# Patient Record
Sex: FEMALE | URBAN - METROPOLITAN AREA
[De-identification: names, ages, dates, MRNs, and addresses within clinical notes are randomized per-mention and may not be internally consistent; named-entity substitution may affect disease eponyms.]

---

## 2022-11-21 PROCEDURE — 80053 COMPREHEN METABOLIC PANEL: CPT | Performed by: NURSE PRACTITIONER

## 2022-11-21 PROCEDURE — 85025 COMPLETE CBC W/AUTO DIFF WBC: CPT | Performed by: NURSE PRACTITIONER

## 2022-11-21 PROCEDURE — 86225 DNA ANTIBODY NATIVE: CPT | Performed by: NURSE PRACTITIONER

## 2022-11-21 PROCEDURE — 86038 ANTINUCLEAR ANTIBODIES: CPT | Performed by: NURSE PRACTITIONER

## 2022-11-21 PROCEDURE — 86235 NUCLEAR ANTIGEN ANTIBODY: CPT | Performed by: NURSE PRACTITIONER

## 2022-11-21 PROCEDURE — 86039 ANTINUCLEAR ANTIBODIES (ANA): CPT | Performed by: NURSE PRACTITIONER

## 2022-11-22 ENCOUNTER — LAB REQUISITION (OUTPATIENT)
Dept: LAB | Facility: HOSPITAL | Age: 37
End: 2022-11-22
Payer: COMMERCIAL

## 2022-11-22 DIAGNOSIS — K21.9 GASTRO-ESOPHAGEAL REFLUX DISEASE WITHOUT ESOPHAGITIS: ICD-10-CM

## 2022-11-22 DIAGNOSIS — M25.50 PAIN IN UNSPECIFIED JOINT: ICD-10-CM

## 2022-11-22 DIAGNOSIS — L29.9 PRURITUS, UNSPECIFIED: ICD-10-CM

## 2022-11-22 LAB
ALBUMIN SERPL-MCNC: 4 G/DL (ref 3.4–5)
ALBUMIN/GLOB SERPL: 1.1 {RATIO} (ref 1–2)
ALP LIVER SERPL-CCNC: 107 U/L
ALT SERPL-CCNC: 63 U/L
ANION GAP SERPL CALC-SCNC: 7 MMOL/L (ref 0–18)
AST SERPL-CCNC: 44 U/L (ref 15–37)
BASOPHILS # BLD AUTO: 0.03 X10(3) UL (ref 0–0.2)
BASOPHILS NFR BLD AUTO: 0.4 %
BILIRUB SERPL-MCNC: 0.3 MG/DL (ref 0.1–2)
BUN BLD-MCNC: 10 MG/DL (ref 7–18)
BUN/CREAT SERPL: 18.9 (ref 10–20)
CALCIUM BLD-MCNC: 9 MG/DL (ref 8.5–10.1)
CHLORIDE SERPL-SCNC: 105 MMOL/L (ref 98–112)
CO2 SERPL-SCNC: 28 MMOL/L (ref 21–32)
CREAT BLD-MCNC: 0.53 MG/DL
DEPRECATED RDW RBC AUTO: 47 FL (ref 35.1–46.3)
EOSINOPHIL # BLD AUTO: 0.22 X10(3) UL (ref 0–0.7)
EOSINOPHIL NFR BLD AUTO: 3.2 %
ERYTHROCYTE [DISTWIDTH] IN BLOOD BY AUTOMATED COUNT: 14 % (ref 11–15)
GFR SERPLBLD BASED ON 1.73 SQ M-ARVRAT: 123 ML/MIN/1.73M2 (ref 60–?)
GLOBULIN PLAS-MCNC: 3.8 G/DL (ref 2.8–4.4)
GLUCOSE BLD-MCNC: 84 MG/DL (ref 70–99)
HCT VFR BLD AUTO: 42.6 %
HGB BLD-MCNC: 13.1 G/DL
IMM GRANULOCYTES # BLD AUTO: 0.01 X10(3) UL (ref 0–1)
IMM GRANULOCYTES NFR BLD: 0.1 %
LYMPHOCYTES # BLD AUTO: 2.64 X10(3) UL (ref 1–4)
LYMPHOCYTES NFR BLD AUTO: 38.3 %
MCH RBC QN AUTO: 27.8 PG (ref 26–34)
MCHC RBC AUTO-ENTMCNC: 30.8 G/DL (ref 31–37)
MCV RBC AUTO: 90.3 FL
MONOCYTES # BLD AUTO: 0.39 X10(3) UL (ref 0.1–1)
MONOCYTES NFR BLD AUTO: 5.7 %
NEUTROPHILS # BLD AUTO: 3.61 X10 (3) UL (ref 1.5–7.7)
NEUTROPHILS # BLD AUTO: 3.61 X10(3) UL (ref 1.5–7.7)
NEUTROPHILS NFR BLD AUTO: 52.3 %
OSMOLALITY SERPL CALC.SUM OF ELEC: 288 MOSM/KG (ref 275–295)
PLATELET # BLD AUTO: 211 10(3)UL (ref 150–450)
POTASSIUM SERPL-SCNC: 4 MMOL/L (ref 3.5–5.1)
PROT SERPL-MCNC: 7.8 G/DL (ref 6.4–8.2)
RBC # BLD AUTO: 4.72 X10(6)UL
SODIUM SERPL-SCNC: 140 MMOL/L (ref 136–145)
WBC # BLD AUTO: 6.9 X10(3) UL (ref 4–11)

## 2022-11-25 LAB
DSDNA AB TITR SER: <10 {TITER}
NUCLEAR IGG TITR SER IF: POSITIVE {TITER}

## 2022-11-26 LAB — ANA NUCLEOLAR TITR SER IF: 5120 {TITER}

## 2022-11-28 LAB
CENTROMERE IGG SER-ACNC: >240 U/ML
ENA JO1 AB SER IA-ACNC: <0.3 U/ML
ENA RNP IGG SER IA-ACNC: 2 U/ML
ENA SCL70 IGG SER IA-ACNC: <0.6 U/ML
ENA SM IGG SER IA-ACNC: <0.7 U/ML
ENA SS-A IGG SER IA-ACNC: <0.4 U/ML
ENA SS-B IGG SER IA-ACNC: <0.4 U/ML
U1 SNRNP IGG SER IA-ACNC: 1.3 U/ML

## 2023-01-17 ENCOUNTER — LAB ENCOUNTER (OUTPATIENT)
Dept: LAB | Facility: HOSPITAL | Age: 38
End: 2023-01-17
Attending: INTERNAL MEDICINE
Payer: COMMERCIAL

## 2023-01-17 ENCOUNTER — OFFICE VISIT (OUTPATIENT)
Dept: RHEUMATOLOGY | Facility: CLINIC | Age: 38
End: 2023-01-17

## 2023-01-17 VITALS
DIASTOLIC BLOOD PRESSURE: 70 MMHG | WEIGHT: 205 LBS | HEIGHT: 62 IN | SYSTOLIC BLOOD PRESSURE: 121 MMHG | HEART RATE: 95 BPM | BODY MASS INDEX: 37.73 KG/M2

## 2023-01-17 DIAGNOSIS — M34.1 CREST SYNDROME (HCC): Primary | ICD-10-CM

## 2023-01-17 DIAGNOSIS — M25.50 POLYARTHRALGIA: ICD-10-CM

## 2023-01-17 DIAGNOSIS — E03.9 HYPOTHYROIDISM, UNSPECIFIED TYPE: ICD-10-CM

## 2023-01-17 DIAGNOSIS — M79.10 MYALGIA: ICD-10-CM

## 2023-01-17 DIAGNOSIS — R76.8 POSITIVE ANA (ANTINUCLEAR ANTIBODY): ICD-10-CM

## 2023-01-17 DIAGNOSIS — M34.1 CREST SYNDROME (HCC): ICD-10-CM

## 2023-01-17 LAB
ALBUMIN SERPL-MCNC: 4 G/DL (ref 3.4–5)
ALP LIVER SERPL-CCNC: 118 U/L
ALT SERPL-CCNC: 50 U/L
AST SERPL-CCNC: 38 U/L (ref 15–37)
BILIRUB DIRECT SERPL-MCNC: 0.1 MG/DL (ref 0–0.2)
BILIRUB SERPL-MCNC: 0.3 MG/DL (ref 0.1–2)
CK SERPL-CCNC: 74 U/L
PROT SERPL-MCNC: 8.2 G/DL (ref 6.4–8.2)
RHEUMATOID FACT SERPL-ACNC: 10 IU/ML (ref ?–15)
THYROGLOB SERPL-MCNC: <15 U/ML (ref ?–60)
THYROPEROXIDASE AB SERPL-ACNC: 59 U/ML (ref ?–60)
TSI SER-ACNC: 2.23 MIU/ML (ref 0.36–3.74)

## 2023-01-17 PROCEDURE — 83516 IMMUNOASSAY NONANTIBODY: CPT

## 2023-01-17 PROCEDURE — 99244 OFF/OP CNSLTJ NEW/EST MOD 40: CPT | Performed by: INTERNAL MEDICINE

## 2023-01-17 PROCEDURE — 84443 ASSAY THYROID STIM HORMONE: CPT

## 2023-01-17 PROCEDURE — 86256 FLUORESCENT ANTIBODY TITER: CPT

## 2023-01-17 PROCEDURE — 36415 COLL VENOUS BLD VENIPUNCTURE: CPT

## 2023-01-17 PROCEDURE — 80076 HEPATIC FUNCTION PANEL: CPT

## 2023-01-17 PROCEDURE — 86200 CCP ANTIBODY: CPT

## 2023-01-17 PROCEDURE — 82085 ASSAY OF ALDOLASE: CPT

## 2023-01-17 PROCEDURE — 86431 RHEUMATOID FACTOR QUANT: CPT

## 2023-01-17 PROCEDURE — 86376 MICROSOMAL ANTIBODY EACH: CPT

## 2023-01-17 PROCEDURE — 3078F DIAST BP <80 MM HG: CPT | Performed by: INTERNAL MEDICINE

## 2023-01-17 PROCEDURE — 86800 THYROGLOBULIN ANTIBODY: CPT

## 2023-01-17 PROCEDURE — 82550 ASSAY OF CK (CPK): CPT

## 2023-01-17 PROCEDURE — 3074F SYST BP LT 130 MM HG: CPT | Performed by: INTERNAL MEDICINE

## 2023-01-17 PROCEDURE — 3008F BODY MASS INDEX DOCD: CPT | Performed by: INTERNAL MEDICINE

## 2023-01-17 NOTE — PATIENT INSTRUCTIONS
Summary:  1. Check labs   2. Info on raynaud's and CREST   3. Hand warmers - consider electric -   4. Keep core temperature warm , use layers   5. Return to clinic in 2-3 weeks.    6. Voltaren gel 1 % topical - use this over her right knee

## 2023-01-19 LAB
ALDOLASE, SERUM: 5.7 U/L
CCP IGG SERPL-ACNC: 1.6 U/ML (ref 0–6.9)
F-ACTIN (SMOOTH MUSCLE) AB: 45 UNITS
MITOCHONDRIAL M2 AB, IGG: 3.7 UNITS

## 2023-02-07 ENCOUNTER — TELEMEDICINE (OUTPATIENT)
Dept: RHEUMATOLOGY | Facility: CLINIC | Age: 38
End: 2023-02-07

## 2023-02-07 DIAGNOSIS — R76.8 POSITIVE ANA (ANTINUCLEAR ANTIBODY): Primary | ICD-10-CM

## 2023-02-07 DIAGNOSIS — M34.1 CREST SYNDROME (HCC): ICD-10-CM

## 2023-02-07 DIAGNOSIS — R79.89 ELEVATED LIVER FUNCTION TESTS: ICD-10-CM

## 2023-02-07 PROCEDURE — 99214 OFFICE O/P EST MOD 30 MIN: CPT | Performed by: INTERNAL MEDICINE

## 2023-02-07 NOTE — PATIENT INSTRUCTIONS
1. Repeat labs in 6 months for liver tests   2. Return to clinic in 6-12 months. 3. Hand warmers - consider electric -   4. Keep core temperature warm , use layers   5. Voltaren gel 1 % topical - use this over her right knee

## 2024-05-23 ENCOUNTER — LAB REQUISITION (OUTPATIENT)
Dept: LAB | Facility: HOSPITAL | Age: 39
End: 2024-05-23

## 2024-05-23 DIAGNOSIS — N95.1 MENOPAUSAL AND FEMALE CLIMACTERIC STATES: ICD-10-CM

## 2024-05-23 DIAGNOSIS — Z01.419 ENCOUNTER FOR GYNECOLOGICAL EXAMINATION (GENERAL) (ROUTINE) WITHOUT ABNORMAL FINDINGS: ICD-10-CM

## 2024-05-23 PROCEDURE — 87624 HPV HI-RISK TYP POOLED RSLT: CPT | Performed by: FAMILY MEDICINE

## 2024-05-24 LAB — HPV I/H RISK 1 DNA SPEC QL NAA+PROBE: POSITIVE

## 2024-05-29 LAB — LAST PAP RESULT: NORMAL

## 2024-06-28 ENCOUNTER — LAB ENCOUNTER (OUTPATIENT)
Dept: LAB | Facility: HOSPITAL | Age: 39
End: 2024-06-28
Attending: FAMILY MEDICINE
Payer: COMMERCIAL

## 2024-06-28 DIAGNOSIS — Z00.00 WELL ADULT EXAM: ICD-10-CM

## 2024-06-28 DIAGNOSIS — N95.1 MENOPAUSAL AND FEMALE CLIMACTERIC STATES: ICD-10-CM

## 2024-06-28 LAB
ALBUMIN SERPL-MCNC: 4.7 G/DL (ref 3.2–4.8)
ALBUMIN/GLOB SERPL: 1.4 {RATIO} (ref 1–2)
ALP LIVER SERPL-CCNC: 107 U/L
ALT SERPL-CCNC: 64 U/L
ANION GAP SERPL CALC-SCNC: 8 MMOL/L (ref 0–18)
AST SERPL-CCNC: 54 U/L (ref ?–34)
BASOPHILS # BLD AUTO: 0.03 X10(3) UL (ref 0–0.2)
BASOPHILS NFR BLD AUTO: 0.4 %
BILIRUB SERPL-MCNC: 0.4 MG/DL (ref 0.3–1.2)
BUN BLD-MCNC: 8 MG/DL (ref 9–23)
BUN/CREAT SERPL: 15.4 (ref 10–20)
CALCIUM BLD-MCNC: 9.6 MG/DL (ref 8.7–10.4)
CHLORIDE SERPL-SCNC: 106 MMOL/L (ref 98–112)
CHOLEST SERPL-MCNC: 204 MG/DL (ref ?–200)
CO2 SERPL-SCNC: 26 MMOL/L (ref 21–32)
CREAT BLD-MCNC: 0.52 MG/DL
DEPRECATED RDW RBC AUTO: 42.9 FL (ref 35.1–46.3)
EGFRCR SERPLBLD CKD-EPI 2021: 122 ML/MIN/1.73M2 (ref 60–?)
EOSINOPHIL # BLD AUTO: 0.28 X10(3) UL (ref 0–0.7)
EOSINOPHIL NFR BLD AUTO: 3.6 %
ERYTHROCYTE [DISTWIDTH] IN BLOOD BY AUTOMATED COUNT: 14 % (ref 11–15)
EST. AVERAGE GLUCOSE BLD GHB EST-MCNC: 123 MG/DL (ref 68–126)
FASTING PATIENT LIPID ANSWER: YES
FASTING STATUS PATIENT QL REPORTED: YES
FSH SERPL-ACNC: 49.5 MIU/ML
GLOBULIN PLAS-MCNC: 3.4 G/DL (ref 2–3.5)
GLUCOSE BLD-MCNC: 101 MG/DL (ref 70–99)
HBA1C MFR BLD: 5.9 % (ref ?–5.7)
HCT VFR BLD AUTO: 40.1 %
HDLC SERPL-MCNC: 36 MG/DL (ref 40–59)
HGB BLD-MCNC: 13.2 G/DL
IMM GRANULOCYTES # BLD AUTO: 0.01 X10(3) UL (ref 0–1)
IMM GRANULOCYTES NFR BLD: 0.1 %
LDLC SERPL CALC-MCNC: 145 MG/DL (ref ?–100)
LYMPHOCYTES # BLD AUTO: 2.7 X10(3) UL (ref 1–4)
LYMPHOCYTES NFR BLD AUTO: 35.1 %
MCH RBC QN AUTO: 27.8 PG (ref 26–34)
MCHC RBC AUTO-ENTMCNC: 32.9 G/DL (ref 31–37)
MCV RBC AUTO: 84.6 FL
MONOCYTES # BLD AUTO: 0.38 X10(3) UL (ref 0.1–1)
MONOCYTES NFR BLD AUTO: 4.9 %
NEUTROPHILS # BLD AUTO: 4.3 X10 (3) UL (ref 1.5–7.7)
NEUTROPHILS # BLD AUTO: 4.3 X10(3) UL (ref 1.5–7.7)
NEUTROPHILS NFR BLD AUTO: 55.9 %
NONHDLC SERPL-MCNC: 168 MG/DL (ref ?–130)
OSMOLALITY SERPL CALC.SUM OF ELEC: 288 MOSM/KG (ref 275–295)
PLATELET # BLD AUTO: 219 10(3)UL (ref 150–450)
POTASSIUM SERPL-SCNC: 4 MMOL/L (ref 3.5–5.1)
PROT SERPL-MCNC: 8.1 G/DL (ref 5.7–8.2)
RBC # BLD AUTO: 4.74 X10(6)UL
SODIUM SERPL-SCNC: 140 MMOL/L (ref 136–145)
TRIGL SERPL-MCNC: 126 MG/DL (ref 30–149)
TSI SER-ACNC: 2.75 MIU/ML (ref 0.55–4.78)
VLDLC SERPL CALC-MCNC: 24 MG/DL (ref 0–30)
WBC # BLD AUTO: 7.7 X10(3) UL (ref 4–11)

## 2024-06-28 PROCEDURE — 83001 ASSAY OF GONADOTROPIN (FSH): CPT

## 2024-06-28 PROCEDURE — 80061 LIPID PANEL: CPT

## 2024-06-28 PROCEDURE — 83036 HEMOGLOBIN GLYCOSYLATED A1C: CPT

## 2024-06-28 PROCEDURE — 36415 COLL VENOUS BLD VENIPUNCTURE: CPT

## 2024-06-28 PROCEDURE — 80053 COMPREHEN METABOLIC PANEL: CPT

## 2024-06-28 PROCEDURE — 84443 ASSAY THYROID STIM HORMONE: CPT

## 2024-06-28 PROCEDURE — 85025 COMPLETE CBC W/AUTO DIFF WBC: CPT

## 2024-07-12 ENCOUNTER — OFFICE VISIT (OUTPATIENT)
Dept: OBGYN CLINIC | Facility: CLINIC | Age: 39
End: 2024-07-12
Payer: COMMERCIAL

## 2024-07-12 VITALS
HEIGHT: 62 IN | SYSTOLIC BLOOD PRESSURE: 116 MMHG | HEART RATE: 98 BPM | WEIGHT: 221 LBS | BODY MASS INDEX: 40.67 KG/M2 | DIASTOLIC BLOOD PRESSURE: 79 MMHG

## 2024-07-12 DIAGNOSIS — E28.39 PREMATURE OVARIAN FAILURE: Primary | ICD-10-CM

## 2024-07-12 DIAGNOSIS — Z01.812 PRE-PROCEDURAL LABORATORY EXAMINATION: ICD-10-CM

## 2024-07-12 DIAGNOSIS — R87.810 CERVICAL HIGH RISK HUMAN PAPILLOMAVIRUS (HPV) DNA TEST POSITIVE: ICD-10-CM

## 2024-07-12 LAB
CONTROL LINE PRESENT WITH A CLEAR BACKGROUND (YES/NO): YES YES/NO
KIT LOT #: NORMAL NUMERIC
PREGNANCY TEST, URINE: NEGATIVE

## 2024-07-12 PROCEDURE — 3074F SYST BP LT 130 MM HG: CPT | Performed by: OBSTETRICS & GYNECOLOGY

## 2024-07-12 PROCEDURE — 99204 OFFICE O/P NEW MOD 45 MIN: CPT | Performed by: OBSTETRICS & GYNECOLOGY

## 2024-07-12 PROCEDURE — 3008F BODY MASS INDEX DOCD: CPT | Performed by: OBSTETRICS & GYNECOLOGY

## 2024-07-12 PROCEDURE — 3078F DIAST BP <80 MM HG: CPT | Performed by: OBSTETRICS & GYNECOLOGY

## 2024-07-12 PROCEDURE — 81025 URINE PREGNANCY TEST: CPT | Performed by: OBSTETRICS & GYNECOLOGY

## 2024-07-12 PROCEDURE — 57455 BIOPSY OF CERVIX W/SCOPE: CPT | Performed by: OBSTETRICS & GYNECOLOGY

## 2024-07-12 RX ORDER — ESTROGEN,CON/M-PROGEST ACET 0.625-5 MG
1 TABLET ORAL DAILY
Qty: 90 TABLET | Refills: 4 | Status: SHIPPED | OUTPATIENT
Start: 2024-07-12

## 2024-07-12 NOTE — PROCEDURES
Consent was obtained after the procedure was explained to the patient and questions answered.  A speculum was placed intravaginally.  Excess mucus was removed with a dry cotton applicator.  I then applied acetic acid to the cervix using a cotton-tipped applicator.  Under magnification I was able to fully visualize the squamocolumnar junction approximately 2 millimeters from the external os.  The green filter was also used at this stage of the exam to observe the vascular patterns.  Acetowhite was visualized at the junction at 12 o'clock and biopsy x 1  done there and sent to pathology.  Silver nitrate was used for hemostasis.   No mosaicism, punctation, or abnormal vessels were seen.  Colposcopy exam was satisfactory.  I discussed with the patient that if the biopsy shows DENA or less (normal) then I will recommend repeating Pap with HPV typing in 1 year. If the biopsy shows DENA-2 or worse then we will most likely recommend a LEEP procedure.  This was also discussed with the patient.  HPV prevention including future condom use in order to maximize resolution of the virus discussed with the patient.  No coitus for 2 weeks advised in order to prevent bleeding from today's biopsy.

## 2024-07-12 NOTE — PROGRESS NOTES
Chelsey Nelson is a 39 year old female  Patient's last menstrual period was 2023.   Chief Complaint   Patient presents with    Consult     Per patient consult on her abnormal pap smear     Pt has been sexually active in past, not now. Pt has hot flushes daily, night sweats.   OBSTETRICS HISTORY:     OB History    Para Term  AB Living   0 0 0 0 0 0   SAB IAB Ectopic Multiple Live Births   0 0 0 0 0       GYNE HISTORY:     Hx Prior Abnormal Pap: Yes   Use of Birth Control (if yes, specify type): None (2024  9:45 AM)  Hx Prior Abnormal Pap: Yes (2024  9:45 AM)        Latest Ref Rng & Units 2024     6:30 PM   RECENT PAP RESULTS   Thinprep Pap Negative for intraepithelial lesion or malignancy Atypical squamous cells of undetermined significance (ASC-US)    HPV Negative Positive          MEDICAL HISTORY:     Past Medical History:    Non Hodgkin's lymphoma (HCC)    Raynaud disease       SURGICAL HISTORY:     History reviewed. No pertinent surgical history.    SOCIAL HISTORY:     Social History     Socioeconomic History    Marital status: Single   Tobacco Use    Smoking status: Never     Passive exposure: Never    Smokeless tobacco: Never   Substance and Sexual Activity    Alcohol use: Not Currently        FAMILY HISTORY:     History reviewed. No pertinent family history.    MEDICATIONS:       Current Outpatient Medications:     estrogen conjugated-medroxyprogesterone (PREMPRO) 0.625-5 MG Oral Tab, Take 1 tablet by mouth daily., Disp: 90 tablet, Rfl: 4    ALLERGIES:       Allergies   Allergen Reactions    Shellfish HIVES, ITCHING, RASH, SHORTNESS OF BREATH, SWELLING, Tightness in Chest and Tightness in Throat         REVIEW OF SYSTEMS:     Constitutional:    denies fever / chills  Eyes:     denies blurred or double vision  Cardiovascular:  denies chest pain or palpitations  Respiratory:    denies shortness of breath  Gastrointestinal:  denies severe abdominal pain, frequent  diarrhea or constipation, nausea / vomiting  Genitourinary:    denies dysuria, bothersome incontinence  Skin/Breast:   denies any breast pain, lumps, or discharge  Neurological:    denies frequent severe headaches  Psychiatric:   denies depression or anxiety, thoughts of harming self or others  Heme/Lymph:    denies easy bruising or bleeding      PHYSICAL EXAM:   Blood pressure 116/79, pulse 98, height 5' 2\" (1.575 m), weight 221 lb (100.2 kg), last menstrual period 11/26/2023.  Constitutional:  well developed, well nourished  Head/Face:  normocephalic  Neck/Thyroid: thyroid symmetric, no thyromegaly, no nodules, no adenopathy  Lymphatic: no abnormal supraclavicular or axillary adenopathy is noted  Respiratory:      chest wall symmetric and nontender on palpation, clear to asculation bilateral, no wheezing, rales, ronchi, and resonance normal upon percussion  Cardiovascular: chest normal in appearance, regular rate and rhythm, no murmurs, PMI palpated midclavicular line  Breast:   normal bilaterally without palpable masses, tenderness, asymmetry, nipple discharge, nipple retraction or skin changes bilaterally  Abdomen:   soft, nontender, nondistended, no masses  Skin/Hair:  no unusual rashes or bruises  Extremities:  no edema, no cyanosis, non tender bilaterally  Psychiatric:   oriented to time, place, person and situation. Appropriate mood and affect    Pelvic Exam:  External Genitalia:  normal appearance, hair distribution, and no lesions  Urethral Meatus:   normal in size, location, without lesions   Bladder:    no fullness, masses or tenderness  Vagina:    normal appearance without lesions, no abnormal discharge  Cervix:    No lesions, normal friability   Uterus:    normal in size, 8 wk sized, normal contour, position, mobility, without  motion tenderness  Adnexa:   normal without masses or tenderness  Perineum:   normal  Anus: no hemorroids         ASSESSMENT & PLAN:     Chelsey was seen today for  consult.    Diagnoses and all orders for this visit:    Premature ovarian failure  -     estrogen conjugated-medroxyprogesterone (PREMPRO) 0.625-5 MG Oral Tab; Take 1 tablet by mouth daily.  I recommend hormone replacement therapy.  She has hot flashes and long-term the hormone replacement therapy might prove beneficial for heart health and osteoporosis prevention.  Risks of stroke heart disease breast cancer and deep vein thromboses were discussed with the patient but I think that the benefits outweigh the risks especially in light of hot flushes and premature ovarian failure due to radiation and chemotherapy history.  Pre-procedural laboratory examination  -     POC Urine pregnancy test [47124]    Cervical high risk human papillomavirus (HPV) DNA test positive  -     Specimen to Pathology Tissue; Future    Pap was ASCUS HPV positive on June 23, 2024.  Colposcopy with cervical biopsy performed today.  If the biopsy is normal or DENA-1 then she can repeat her Pap smear with Dr. Guillen in 1 year and can see me for repeat biopsy if it is abnormal.  If the biopsy is DENA-2 or 3 then I will do a LEEP procedure in the office.  This was discussed with the patient as well.      FOLLOW-UP     No follow-ups on file.      David Chamberlain MD  7/12/2024

## 2024-07-16 ENCOUNTER — TELEPHONE (OUTPATIENT)
Dept: OBGYN CLINIC | Facility: CLINIC | Age: 39
End: 2024-07-16

## 2024-07-16 NOTE — TELEPHONE ENCOUNTER
----- Message from David Chamberlain sent at 7/16/2024 10:47 AM CDT -----  DENA-1.  I recommend a follow-up Pap smear with HPV typing in 1 year.  Please call the patient and notify her and if possible schedule her for a return appointment in 1 year

## 2024-07-16 NOTE — TELEPHONE ENCOUNTER
Called pt to inform of biopsy results DENA-1 recommends repeat pap smear with HPV typing in 1 year. Called pt informed of results pt understood will repeat in 1 year no further questions

## 2024-07-19 ENCOUNTER — HOSPITAL ENCOUNTER (OUTPATIENT)
Dept: ULTRASOUND IMAGING | Facility: HOSPITAL | Age: 39
Discharge: HOME | End: 2024-07-19
Attending: FAMILY MEDICINE
Payer: COMMERCIAL

## 2024-07-19 ENCOUNTER — HOSPITAL ENCOUNTER (OUTPATIENT)
Dept: MAMMOGRAPHY | Facility: HOSPITAL | Age: 39
Discharge: HOME | End: 2024-07-19
Attending: FAMILY MEDICINE
Payer: COMMERCIAL

## 2024-07-19 DIAGNOSIS — E04.1 NONTOXIC SINGLE THYROID NODULE: ICD-10-CM

## 2024-07-19 DIAGNOSIS — Z12.31 ENCOUNTER FOR SCREENING MAMMOGRAM FOR MALIGNANT NEOPLASM OF BREAST: ICD-10-CM

## 2024-07-19 PROCEDURE — 77067 SCR MAMMO BI INCL CAD: CPT | Performed by: FAMILY MEDICINE

## 2024-07-19 PROCEDURE — 77063 BREAST TOMOSYNTHESIS BI: CPT | Performed by: FAMILY MEDICINE

## 2024-07-19 PROCEDURE — 76536 US EXAM OF HEAD AND NECK: CPT | Performed by: FAMILY MEDICINE

## 2024-07-26 ENCOUNTER — HOSPITAL ENCOUNTER (OUTPATIENT)
Dept: MAMMOGRAPHY | Facility: HOSPITAL | Age: 39
Discharge: HOME | End: 2024-07-26
Attending: FAMILY MEDICINE
Payer: COMMERCIAL

## 2024-07-26 DIAGNOSIS — R92.8 ABNORMAL MAMMOGRAM: ICD-10-CM

## 2024-07-26 PROCEDURE — 77061 BREAST TOMOSYNTHESIS UNI: CPT | Performed by: FAMILY MEDICINE

## 2024-07-26 PROCEDURE — 77065 DX MAMMO INCL CAD UNI: CPT | Performed by: FAMILY MEDICINE

## 2024-08-13 ENCOUNTER — OFFICE VISIT (OUTPATIENT)
Dept: OTOLARYNGOLOGY | Facility: CLINIC | Age: 39
End: 2024-08-13
Payer: COMMERCIAL

## 2024-08-13 DIAGNOSIS — E04.1 THYROID NODULE: Primary | ICD-10-CM

## 2024-08-13 PROCEDURE — G2211 COMPLEX E/M VISIT ADD ON: HCPCS | Performed by: STUDENT IN AN ORGANIZED HEALTH CARE EDUCATION/TRAINING PROGRAM

## 2024-08-13 PROCEDURE — 99204 OFFICE O/P NEW MOD 45 MIN: CPT | Performed by: STUDENT IN AN ORGANIZED HEALTH CARE EDUCATION/TRAINING PROGRAM

## 2024-08-13 NOTE — PROGRESS NOTES
Chelsey Nelson is a 39 year old female.   Chief Complaint   Patient presents with    Thyroid Nodule     Consult on thyroid nodule      HPI:   39-year-old presents for consultation of a right-sided thyroid nodule.  It was picked up during a routine visit with her primary care physician.  An ultrasound demonstrated a dominant right-sided nodule measuring 5.3 cm.  She notes a little difficulty swallowing no voice changes.  No abnormalities on her thyroid labs.  She has a history of lymphoma.  No family history of thyroid cancer    Current Outpatient Medications   Medication Sig Dispense Refill    estrogen conjugated-medroxyprogesterone (PREMPRO) 0.625-5 MG Oral Tab Take 1 tablet by mouth daily. 90 tablet 4      Past Medical History:    Non Hodgkin's lymphoma (HCC)    Raynaud disease      Social History:  Social History     Socioeconomic History    Marital status: Single   Tobacco Use    Smoking status: Never     Passive exposure: Never    Smokeless tobacco: Never   Substance and Sexual Activity    Alcohol use: Not Currently      No past surgical history on file.      EXAM:   LMP 2023     System Details   Skin Inspection - Normal.   Constitutional Overall appearance - Normal.   Head/Face Symmetric, TMJ tenderness not present    Eyes EOMI, PERRL   Right ear:  Canal clear, TM intact, no ARASH   Left ear:  Canal clear, TM intact, no ARASH   Nose: Septum midline, inferior turbinates not enlarged, nasal valves without collapse    Oral cavity/Oropharynx: No lesions or masses on inspection or palpation, tonsils symmetric    Neck: Soft without LAD, thyroid with palpable right-sided nodule   voice clear/ no stridor   Other:      SCOPES AND PROCEDURES:         AUDIOGRAM AND IMAGIN/19/24 thyroid us  Multinodular thyroid with heterogeneous parenchyma suggesting thyroiditis.      Right lobe upper pole nodule measures 1.6 cm and right pole dominant mid to lower pole nodule measures 5.3 cm.  These nodules meet ACR TI-RADS  criteria for biopsy.      Additional smaller nodules which do not meet criteria for biopsy or follow-up.     IMPRESSION:   1. Thyroid nodule  - US FNA THYROID, GUIDE INCLD, 2 LESIONS (CPT=10005/62307); Future       Recommendations:  -Discussed the ultrasound with her in detail that demonstrates a dominant right-sided nodule measuring up to 5.3 cm  -Will obtain an ultrasound-guided FNA of the dominant and also the smaller right-sided thyroid nodules  -Given the nodules above 3 cm and the risk for a false negative discussed the utility of hemithyroidectomy  -Discussed that the biopsy result comes back suspicious for cancer this may require a total thyroidectomy  -Will follow-up on the biopsy results  -May obtain a CT scan of her neck given the large size of the nodule if considering surgery in the future    The patient indicates understanding of these issues and agrees to the plan.  Longitudinal care will be provided with follow-up and possible surgery    Ifeanyi Ram MD  8/13/2024  7:36 AM

## 2024-09-06 NOTE — DISCHARGE INSTRUCTIONS
Discharge Instructions for Fine-Needle Thyroid Biopsy  You have had a procedure called fine-needle thyroid biopsy. This biopsy was done to learn more about a nodule or cyst in your thyroid gland or to find out what might be causing enlargement of your thyroid. During the biopsy, a very thin needle is inserted through the skin into the gland. The needle is used to remove a small amount of tissue from the gland. More than 1 tissue sample may be done to be sure to get cells from all parts of the nodule. Or the needle might be used to drain fluid from a cyst. Often a special ultrasound probe or transducer is used to help guide the needle to the right place. The tissue or fluid is then studied in a lab.    Home care  Here are some tips to take care of yourself at home:   You will have a small adhesive bandage on your biopsy site. Leave the bandage on for 4 to 6 hours. After this time, you don't need to keep it covered.   If you feel discomfort after the biopsy, take over-the-counter pain medicine. Don't take aspirin. It's normal to feel sore for a day or 2.  Ask your healthcare provider when you can return to normal activities. This will likely be the same day as your procedure.  Getting your results  Your biopsy results may take a few days. When the results are ready, your healthcare provider will discuss them with you and tell you what, if anything, needs to be done next. If you have questions, consider writing them down so you won't forget to ask when the provider calls.   Follow-up  Make a follow-up appointment as directed by your healthcare provider.  When to call your healthcare provider  Call your healthcare provider right away if you have any of the following:  Bleeding that won’t stop  Shortness of breath, trouble breathing or speaking, or a change in your voice  Fever of 100.4°F (38°C) or higher, or as directed by your provider  Increasing pain, redness, tenderness, or drainage at the biopsy site  Swelling of  the biopsy site  Be sure you understand what problems you should watch for and know how to reach the healthcare provider after hours and on weekends.    Lars last reviewed this educational content on 12/1/2021 © 2000-2023 [employer]. All rights reserved. This information is not intended as a substitute for professional medical care. Always follow your healthcare professional's instructions.

## 2024-09-11 ENCOUNTER — HOSPITAL ENCOUNTER (OUTPATIENT)
Dept: ULTRASOUND IMAGING | Facility: HOSPITAL | Age: 39
Discharge: HOME | End: 2024-09-11
Attending: STUDENT IN AN ORGANIZED HEALTH CARE EDUCATION/TRAINING PROGRAM
Payer: COMMERCIAL

## 2024-09-11 DIAGNOSIS — E04.1 THYROID NODULE: ICD-10-CM

## 2024-09-11 PROCEDURE — 88172 CYTP DX EVAL FNA 1ST EA SITE: CPT | Performed by: STUDENT IN AN ORGANIZED HEALTH CARE EDUCATION/TRAINING PROGRAM

## 2024-09-11 PROCEDURE — 10006 FNA BX W/US GDN EA ADDL: CPT | Performed by: STUDENT IN AN ORGANIZED HEALTH CARE EDUCATION/TRAINING PROGRAM

## 2024-09-11 PROCEDURE — 10005 FNA BX W/US GDN 1ST LES: CPT | Performed by: STUDENT IN AN ORGANIZED HEALTH CARE EDUCATION/TRAINING PROGRAM

## 2024-09-11 PROCEDURE — 88177 CYTP FNA EVAL EA ADDL: CPT | Performed by: STUDENT IN AN ORGANIZED HEALTH CARE EDUCATION/TRAINING PROGRAM

## 2024-09-11 PROCEDURE — 88173 CYTOPATH EVAL FNA REPORT: CPT | Performed by: STUDENT IN AN ORGANIZED HEALTH CARE EDUCATION/TRAINING PROGRAM

## 2024-09-11 NOTE — IMAGING NOTE
1340  Pt arrived to ultrasound room    History taken and as follows:   .     Procedure explained questions answered, PT V/U AND AGREES W/POC-  Consent verified and obtained      1345  Scans taken by IVANIA  ultrasound  sonographer   1400  scans reviewed by Dr. WILD     1402 Site marked  Time out taken      1406 Area cleaned sterile towels  to site. Pathology  was  notified.      1407  Lidocaine 1% 10 milligrams per ml  from kit  was given 2 ml         RIGHT THYROID NODULE # 1 UPPER   1408 FNA # 1 taken  with 22 g needle    1409  FNA # 2 taken   with 22 g needle  NO 3RD PASSESS NEEDED PER PATHOLOGIST DR RAMIREZ PRESENT      SPECIMEN @ 2 RIGHT  MID LOWER  5.3 CM  -  Lidocaine 1% 10 milligrams per ml  from kit  was given 2 ml       1414 FNA # 1 taken  with 22 g needle    1415 FNA # 2 taken   with 22 g needle    FNA # 3  NOT NEEDED PER  PATHOLOGIST   1419 SPECIMEN LABEL VERIFIED BY RN W/CYTOLOGIST PRESENT, SPECIMEN TO LAB BY CYTO    1421  Procedure completed area re scanned . Area cleaned band aid to site ice pack to site.        1425 Post instructions given  verbal et written with AVS summary sheet provided to patient.  Also instructed patient to refrain from drinking or eating anything hot for several hours after biopsy  to prevent increase bleeding from occurring.    1426  Pt  discharged .

## 2024-10-01 ENCOUNTER — OFFICE VISIT (OUTPATIENT)
Dept: OTOLARYNGOLOGY | Facility: CLINIC | Age: 39
End: 2024-10-01
Payer: COMMERCIAL

## 2024-10-01 DIAGNOSIS — E04.1 THYROID NODULE: Primary | ICD-10-CM

## 2024-10-01 PROCEDURE — G2211 COMPLEX E/M VISIT ADD ON: HCPCS | Performed by: STUDENT IN AN ORGANIZED HEALTH CARE EDUCATION/TRAINING PROGRAM

## 2024-10-01 PROCEDURE — 99215 OFFICE O/P EST HI 40 MIN: CPT | Performed by: STUDENT IN AN ORGANIZED HEALTH CARE EDUCATION/TRAINING PROGRAM

## 2024-10-01 NOTE — PROGRESS NOTES
Chelsey Nelson is a 39 year old female.   Chief Complaint   Patient presents with    Biopsy Results     Patient is here for follow up of biopsy results      HPI:   39-year-old in follow-up regarding her large right-sided thyroid nodule.  On recent ultrasound measured 5.3 x 3.5 cm.  She had a biopsy of this via FNA that was benign.  She has a history of lymphoma.  TSH was normal in June.  No family history of thyroid cancer.  Denies any changes to her swallowing or voice    No current outpatient medications on file.      Past Medical History:    Non Hodgkin's lymphoma (HCC)    Raynaud disease      Social History:  Social History     Socioeconomic History    Marital status: Single   Tobacco Use    Smoking status: Never     Passive exposure: Never    Smokeless tobacco: Never   Substance and Sexual Activity    Alcohol use: Not Currently      History reviewed. No pertinent surgical history.      EXAM:   [facility] 11/26/2023     System Details   Skin Inspection - Normal.   Constitutional Overall appearance - Normal.   Head/Face Symmetric, TMJ tenderness not present    Eyes EOMI, PERRL   Right ear:  Canal clear, TM intact, no ARASH   Left ear:  Canal clear, TM intact, no ARASH   Nose: Septum midline, inferior turbinates not enlarged, nasal valves without collapse    Oral cavity/Oropharynx: No lesions or masses on inspection or palpation, tonsils symmetric    Neck: Soft without LAD, thyroid with palpable right-sided thyroid nodule  Voice clear/ no stridor   Other:      SCOPES AND PROCEDURES:         AUDIOGRAM AND IMAGING:     Ultrasound thyroid  CONCLUSION:      Multinodular thyroid with heterogeneous parenchyma suggesting thyroiditis.      Right lobe upper pole nodule measures 1.6 cm and right pole dominant mid to lower pole nodule measures 5.3 cm.  These nodules meet ACR TI-RADS criteria for biopsy.      Additional smaller nodules which do not meet criteria for biopsy or follow-up.     IMPRESSION:   1. Thyroid nodule        Recommendations:  -Given the large size of this nodule over than 3 cm discussed the higher chance of a false negative on the FNA.  Will proceed with hemithyroidectomy given the 5.3 cm size.  No need for intervention on the left lobe.  -Will obtain a CT scan of her neck to characterize the nodule and assure no substernal extension  -Will obtain medical clearance.  Higher risk of anesthesia and difficulty with surgery due to her obesity  -Discussed the surgery in detail including risks and benefits and alternatives    I educated the patient on the pathophysiology of their thyroid disorder. Thyroidectomy was discussed including risks, benefits and alternatives. Risks include injury to the nerves that control the vocal cords causing temporary or permanent hoarseness or respiratory distress, hypocalcemia from hypoparathyroidism, bleeding, infection, risks of general anesthesia, and need for additional procedures. The patient understands, all questions were answered and would like to proceed.     The patient indicates understanding of these issues and agrees to the plan.  Longitudinal care will be provided with surgery and follow-up    Ifeanyi Ram MD  10/1/2024  7:24 AM

## 2024-10-02 ENCOUNTER — TELEPHONE (OUTPATIENT)
Dept: OTOLARYNGOLOGY | Facility: CLINIC | Age: 39
End: 2024-10-02

## 2024-10-02 DIAGNOSIS — E04.1 THYROID NODULE: Primary | ICD-10-CM

## 2024-10-02 NOTE — TELEPHONE ENCOUNTER
Patient scheduled for RIGHT ECTOR-THYROIDECTOMY on 11/18/24 at [facility]. Patient aware to obtain medical clearance prior to surgery, faxed request to PCP office.

## 2024-10-19 ENCOUNTER — HOSPITAL ENCOUNTER (OUTPATIENT)
Dept: CT IMAGING | Facility: HOSPITAL | Age: 39
Discharge: HOME | End: 2024-10-19
Attending: STUDENT IN AN ORGANIZED HEALTH CARE EDUCATION/TRAINING PROGRAM
Payer: COMMERCIAL

## 2024-10-19 DIAGNOSIS — E04.1 THYROID NODULE: ICD-10-CM

## 2024-10-19 LAB
CREAT BLD-MCNC: 0.4 MG/DL
EGFRCR SERPLBLD CKD-EPI 2021: 130 ML/MIN/1.73M2 (ref 60–?)

## 2024-10-19 PROCEDURE — 70491 CT SOFT TISSUE NECK W/DYE: CPT | Performed by: STUDENT IN AN ORGANIZED HEALTH CARE EDUCATION/TRAINING PROGRAM

## 2024-10-19 PROCEDURE — 82565 ASSAY OF CREATININE: CPT

## 2024-10-21 ENCOUNTER — PATIENT MESSAGE (OUTPATIENT)
Dept: OTOLARYNGOLOGY | Facility: CLINIC | Age: 39
End: 2024-10-21

## 2024-11-01 ENCOUNTER — TELEPHONE (OUTPATIENT)
Dept: OTOLARYNGOLOGY | Facility: CLINIC | Age: 39
End: 2024-11-01

## 2024-11-01 NOTE — TELEPHONE ENCOUNTER
Family Medical Leave Act forms received via fax. Logged for processing. Sent [employer] message for Release of Information completion.

## 2024-11-07 NOTE — TELEPHONE ENCOUNTER
Patient called forms dept. Patient states she received [employer] message for completion of Release of Information. Per patient please call her when forms ready she will be picking up her forms at providers office.       Family Medical Leave Act continuous leave due to sx. Start date 11/18/24-3wks.

## 2024-11-07 NOTE — TELEPHONE ENCOUNTER
Dr. Ram,    Please sign off on form if you agree to: Family Medical Leave Act     -Signature page will be the first page scanned  -From your Inbasket, Highlight the patient and click Chart   -Double click the 11/1/24 Forms Completion telephone encounter  -Scroll down to the Media section   -Click the blue Hyperlink: Family Medical Leave Act  11/7/24   -Click Acknowledge located in the top right ribbon/menu   -Drag the mouse into the blank space of the document and a + sign will appear. Left click to   electronically sign the document.  -Once signed, simply exit out of the screen and you signature will be saved.     Thank you,     Brooke    Written prescription put at  for patient  and mychart message sent to patient saying the same.

## 2024-11-13 NOTE — TELEPHONE ENCOUNTER
,    I can change the forms to reflect 1 week post op or 2 week post op, which would you support?    Thank you,  Brooke

## 2024-11-14 ENCOUNTER — PATIENT MESSAGE (OUTPATIENT)
Dept: OTOLARYNGOLOGY | Facility: CLINIC | Age: 39
End: 2024-11-14

## 2024-11-14 NOTE — TELEPHONE ENCOUNTER
Dr. Ram,     Please sign off on form if you agree to: revised Family Medical Leave Act      -Signature page will be the first page scanned  -From your Inbasket, Highlight the patient and click Chart   -Double click the 11/1/24 Forms Completion telephone encounter  -Scroll down to the Media section   -Click the blue Hyperlink: Family Medical Leave Act  11/14/24   -Click Acknowledge located in the top right ribbon/menu   -Drag the mouse into the blank space of the document and a + sign will appear. Left click to   electronically sign the document.  -Once signed, simply exit out of the screen and you signature will be saved.     Thank you,      Brooke

## 2024-11-14 NOTE — TELEPHONE ENCOUNTER
Dr. Ram, see patient's [employer] message.  It sounds like the forms dept sent you that link requiring your signature for Chelsey's forms.  She has to turn them in tomorrow to her employer. Please advise.

## 2024-11-14 NOTE — TELEPHONE ENCOUNTER
Patient sent [employer] message to check status of her forms. Forms were signed by provider this morning. Forms uploaded to patient [employer]. No authorization on file.

## 2024-11-15 NOTE — DISCHARGE INSTRUCTIONS
Recovering after surgery  Get plenty of rest.  Care for your cut (incision) as directed.  Don't do any heavy lifting and activity that uses a lot of energy for 3 to 5 weeks.  Walk a few times daily. But don’t push yourself too hard. Slowly increase how fast and how far you go, as you feel able.    When to get medical care  Call your healthcare provider right away if you have any of the following:   Fever above 100.4°F (38°C), or as advised by your healthcare provider  Swelling or bleeding at the incision site  Choking  Trouble breathing  A sore throat that lasts longer than 7 days  Tingling or cramps in your hands, feet, or lips  Hoarseness that doesn't improve or that gets worse     HOME INSTRUCTIONS  AMBS[facility]G HOME CARE INSTRUCTIONS: POST-OP ANESTHESIA  The medication that you received for sedation or general anesthesia can last up to 24 hours. Your judgment and reflexes may be altered, even if you feel like your normal self.      We Recommend:   Do not drive any motor vehicle or bicycle   Avoid mowing the lawn, playing sports, or working with power tools/applicances (power saws, electric knives or mixers)   That you have someone stay with you on your first night home   Do not drink alcohol or take sleeping pills or tranquilizers   Do not sign legal documents within 24 hours of your procedure   If you had a nerve block for your surgery, take extra care not to put any pressure on your arm or hand for 24 hours    It is normal:  For you to have a sore throat if you had a breathing tube during surgery (while you were asleep!). The sore throat should get better within 48 hours. You can gargle with warm salt water (1/2 tsp in 4 oz warm water) or use a throat lozenge for comfort  To feel muscle aches or soreness especially in the abdomen, chest or neck. The achy feeling should go away in the next 24 hours  To feel weak, sleepy or \"wiped out\". Your should start feeling better in the next 24 hours.   To experience mild  discomforts such as sore lip or tongue, headache, cramps, gas pains or a bloated feeling in your abdomen.   To experience mild back pain or soreness for a day or two if you had spinal or epidural anesthesia.   If you had laparoscopic surgery, to feel shoulder pain or discomfort on the day of surgery.   For some patients to have nausea after surgery/anesthesia    If you feel nausea or experience vomiting:   Try to move around less.   Eat less than usual or drink only liquids until the next morning   Nausea should resolve in about 24 hours    If you have a problem when you are at home:    Call your surgeons office   Discharge Instructions: After Your Surgery  You’ve just had surgery. During surgery, you were given medicine called anesthesia to keep you relaxed and free of pain. After surgery, you may have some pain or nausea. This is common. Here are some tips for feeling better and getting well after surgery.   Going home  Your healthcare provider will show you how to take care of yourself when you go home. They'll also answer your questions. Have an adult family member or friend drive you home. For the first 24 hours after your surgery:   Don't drive or use heavy equipment.  Don't make important decisions or sign legal papers.  Take medicines as directed.  Don't drink alcohol.  Have someone stay with you, if needed. They can watch for problems and help keep you safe.  Be sure to go to all follow-up visits with your healthcare provider. And rest after your surgery for as long as your provider tells you to.   Coping with pain  If you have pain after surgery, pain medicine will help you feel better. Take it as directed, before pain becomes severe. Also, ask your healthcare provider or pharmacist about other ways to control pain. This might be with heat, ice, or relaxation. And follow any other instructions your surgeon or nurse gives you.      Stay on schedule with your medicine.     Tips for taking pain medicine  To  get the best relief possible, remember these points:   Pain medicines can upset your stomach. Taking them with a little food may help.  Most pain relievers taken by mouth need at least 20 to 30 minutes to start to work.  Don't wait till your pain becomes severe before you take your medicine. Try to time your medicine so that you can take it before starting an activity. This might be before you get dressed, go for a walk, or sit down for dinner.  Constipation is a common side effect of some pain medicines. Call your healthcare provider before taking any medicines such as laxatives or stool softeners to help ease constipation. Also ask if you should skip any foods. Drinking lots of fluids and eating foods such as fruits and vegetables that are high in fiber can also help. Remember, don't take laxatives unless your surgeon has prescribed them.  Drinking alcohol and taking pain medicine can cause dizziness and slow your breathing. It can even be deadly. Don't drink alcohol while taking pain medicine.  Pain medicine can make you react more slowly to things. Don't drive or run machinery while taking pain medicine.  Your healthcare provider may tell you to take acetaminophen to help ease your pain. Ask them how much you're supposed to take each day. Acetaminophen or other pain relievers may interact with your prescription medicines or other over-the-counter (OTC) medicines. Some prescription medicines have acetaminophen and other ingredients in them. Using both prescription and OTC acetaminophen for pain can cause you to accidentally overdose. Read the labels on your OTC medicines with care. This will help you to clearly know the list of ingredients, how much to take, and any warnings. It may also help you not take too much acetaminophen. If you have questions or don't understand the information, ask your pharmacist or healthcare provider to explain it to you before you take the OTC medicine.   Managing nausea  Some people  have an upset stomach (nausea) after surgery. This is often because of anesthesia, pain, or pain medicine, less movement of food in the stomach, or the stress of surgery. These tips will help you handle nausea and eat healthy foods as you get better. If you were on a special food plan before surgery, ask your healthcare provider if you should follow it while you get better. Check with your provider on how your eating should progress. It may depend on the surgery you had. These general tips may help:   Don't push yourself to eat. Your body will tell you when to eat and how much.  Start off with clear liquids and soup. They're easier to digest.  Next try semi-solid foods as you feel ready. These include mashed potatoes, applesauce, and gelatin.  Slowly move to solid foods. Don’t eat fatty, rich, or spicy foods at first.  Don't force yourself to have 3 large meals a day. Instead eat smaller amounts more often.  Take pain medicines with a small amount of solid food, such as crackers or toast. This helps prevent nausea.  When to call your healthcare provider  Call your healthcare provider right away if you have any of these:   You still have too much pain, or the pain gets worse, after taking the medicine. The medicine may not be strong enough. Or there may be a complication from the surgery.  You feel too sleepy, dizzy, or groggy. The medicine may be too strong.  Side effects such as nausea or vomiting. Your healthcare provider may advise taking other medicines to .  Skin changes such as rash, itching, or hives. This may mean you have an allergic reaction. Your provider may advise taking other medicines.  The incision looks different (for instance, part of it opens up).  Bleeding or fluid leaking from the incision site, and weren't told to expect that.  Fever of 100.4°F (38°C) or higher, or as directed by your provider.  Call 911  Call 911 right away if you have:   Trouble breathing  Facial swelling    If you have  obstructive sleep apnea   You were given anesthesia medicine during surgery to keep you comfortable and free of pain. After surgery, you may have more apnea spells because of this medicine and other medicines you were given. The spells may last longer than normal.    At home:  Keep using the continuous positive airway pressure (CPAP) device when you sleep. Unless your healthcare provider tells you not to, use it when you sleep, day or night. CPAP is a common device used to treat obstructive sleep apnea.  Talk with your provider before taking any pain medicine, muscle relaxants, or sedatives. Your provider will tell you about the possible dangers of taking these medicines.  Contact your provider if your sleeping changes a lot even when taking medicines as directed.

## 2024-11-18 ENCOUNTER — HOSPITAL ENCOUNTER (OUTPATIENT)
Facility: HOSPITAL | Age: 39
Setting detail: HOSPITAL OUTPATIENT SURGERY
Discharge: HOME | End: 2024-11-18
Attending: STUDENT IN AN ORGANIZED HEALTH CARE EDUCATION/TRAINING PROGRAM | Admitting: STUDENT IN AN ORGANIZED HEALTH CARE EDUCATION/TRAINING PROGRAM
Payer: COMMERCIAL

## 2024-11-18 ENCOUNTER — ANESTHESIA EVENT (OUTPATIENT)
Dept: SURGERY | Facility: HOSPITAL | Age: 39
End: 2024-11-18
Payer: COMMERCIAL

## 2024-11-18 ENCOUNTER — ANESTHESIA (OUTPATIENT)
Dept: SURGERY | Facility: HOSPITAL | Age: 39
End: 2024-11-18
Payer: COMMERCIAL

## 2024-11-18 VITALS
SYSTOLIC BLOOD PRESSURE: 140 MMHG | RESPIRATION RATE: 16 BRPM | HEIGHT: 62 IN | WEIGHT: 225 LBS | DIASTOLIC BLOOD PRESSURE: 86 MMHG | TEMPERATURE: 98 F | HEART RATE: 98 BPM | OXYGEN SATURATION: 100 % | BODY MASS INDEX: 41.41 KG/M2

## 2024-11-18 DIAGNOSIS — E04.1 THYROID NODULE: ICD-10-CM

## 2024-11-18 LAB — B-HCG UR QL: NEGATIVE

## 2024-11-18 PROCEDURE — S0119 ONDANSETRON 4 MG: HCPCS

## 2024-11-18 PROCEDURE — 88305 TISSUE EXAM BY PATHOLOGIST: CPT | Performed by: STUDENT IN AN ORGANIZED HEALTH CARE EDUCATION/TRAINING PROGRAM

## 2024-11-18 PROCEDURE — 81025 URINE PREGNANCY TEST: CPT

## 2024-11-18 PROCEDURE — 0GTH0ZZ RESECTION OF RIGHT THYROID GLAND LOBE, OPEN APPROACH: ICD-10-PCS | Performed by: STUDENT IN AN ORGANIZED HEALTH CARE EDUCATION/TRAINING PROGRAM

## 2024-11-18 PROCEDURE — 88307 TISSUE EXAM BY PATHOLOGIST: CPT | Performed by: STUDENT IN AN ORGANIZED HEALTH CARE EDUCATION/TRAINING PROGRAM

## 2024-11-18 RX ORDER — METOCLOPRAMIDE 10 MG/1
10 TABLET ORAL ONCE
Status: COMPLETED | OUTPATIENT
Start: 2024-11-18 | End: 2024-11-18

## 2024-11-18 RX ORDER — ONDANSETRON 2 MG/ML
4 INJECTION INTRAMUSCULAR; INTRAVENOUS EVERY 6 HOURS PRN
Status: DISCONTINUED | OUTPATIENT
Start: 2024-11-18 | End: 2024-11-18

## 2024-11-18 RX ORDER — NALOXONE HYDROCHLORIDE 0.4 MG/ML
0.08 INJECTION, SOLUTION INTRAMUSCULAR; INTRAVENOUS; SUBCUTANEOUS AS NEEDED
Status: DISCONTINUED | OUTPATIENT
Start: 2024-11-18 | End: 2024-11-18

## 2024-11-18 RX ORDER — ONDANSETRON 2 MG/ML
INJECTION INTRAMUSCULAR; INTRAVENOUS AS NEEDED
Status: DISCONTINUED | OUTPATIENT
Start: 2024-11-18 | End: 2024-11-18 | Stop reason: SURG

## 2024-11-18 RX ORDER — HYDROCODONE BITARTRATE AND ACETAMINOPHEN 5; 325 MG/1; MG/1
1 TABLET ORAL EVERY 6 HOURS PRN
Qty: 12 TABLET | Refills: 0 | Status: SHIPPED | OUTPATIENT
Start: 2024-11-18 | End: 2024-11-21

## 2024-11-18 RX ORDER — SODIUM CHLORIDE, SODIUM LACTATE, POTASSIUM CHLORIDE, CALCIUM CHLORIDE 600; 310; 30; 20 MG/100ML; MG/100ML; MG/100ML; MG/100ML
INJECTION, SOLUTION INTRAVENOUS CONTINUOUS
Status: DISCONTINUED | OUTPATIENT
Start: 2024-11-18 | End: 2024-11-18

## 2024-11-18 RX ORDER — FAMOTIDINE 10 MG/ML
20 INJECTION, SOLUTION INTRAVENOUS ONCE
Status: COMPLETED | OUTPATIENT
Start: 2024-11-18 | End: 2024-11-18

## 2024-11-18 RX ORDER — ONDANSETRON 4 MG/1
4 TABLET, ORALLY DISINTEGRATING ORAL ONCE
Status: COMPLETED | OUTPATIENT
Start: 2024-11-18 | End: 2024-11-18

## 2024-11-18 RX ORDER — HYDROMORPHONE HYDROCHLORIDE 1 MG/ML
0.2 INJECTION, SOLUTION INTRAMUSCULAR; INTRAVENOUS; SUBCUTANEOUS EVERY 5 MIN PRN
Status: DISCONTINUED | OUTPATIENT
Start: 2024-11-18 | End: 2024-11-18

## 2024-11-18 RX ORDER — SCOLOPAMINE TRANSDERMAL SYSTEM 1 MG/1
1 PATCH, EXTENDED RELEASE TRANSDERMAL
Status: DISCONTINUED | OUTPATIENT
Start: 2024-11-18 | End: 2024-11-18 | Stop reason: HOSPADM

## 2024-11-18 RX ORDER — LIDOCAINE HYDROCHLORIDE AND EPINEPHRINE 10; 10 MG/ML; UG/ML
INJECTION, SOLUTION INFILTRATION; PERINEURAL AS NEEDED
Status: DISCONTINUED | OUTPATIENT
Start: 2024-11-18 | End: 2024-11-18 | Stop reason: HOSPADM

## 2024-11-18 RX ORDER — ROCURONIUM BROMIDE 10 MG/ML
INJECTION, SOLUTION INTRAVENOUS AS NEEDED
Status: DISCONTINUED | OUTPATIENT
Start: 2024-11-18 | End: 2024-11-18 | Stop reason: SURG

## 2024-11-18 RX ORDER — FAMOTIDINE 20 MG/1
20 TABLET, FILM COATED ORAL ONCE
Status: COMPLETED | OUTPATIENT
Start: 2024-11-18 | End: 2024-11-18

## 2024-11-18 RX ORDER — HYDROCODONE BITARTRATE AND ACETAMINOPHEN 5; 325 MG/1; MG/1
1 TABLET ORAL ONCE
Status: COMPLETED | OUTPATIENT
Start: 2024-11-18 | End: 2024-11-18

## 2024-11-18 RX ORDER — ACETAMINOPHEN 500 MG
1000 TABLET ORAL ONCE
Status: COMPLETED | OUTPATIENT
Start: 2024-11-18 | End: 2024-11-18

## 2024-11-18 RX ORDER — DEXAMETHASONE SODIUM PHOSPHATE 4 MG/ML
VIAL (ML) INJECTION AS NEEDED
Status: DISCONTINUED | OUTPATIENT
Start: 2024-11-18 | End: 2024-11-18 | Stop reason: SURG

## 2024-11-18 RX ORDER — PROCHLORPERAZINE EDISYLATE 5 MG/ML
5 INJECTION INTRAMUSCULAR; INTRAVENOUS EVERY 8 HOURS PRN
Status: DISCONTINUED | OUTPATIENT
Start: 2024-11-18 | End: 2024-11-18

## 2024-11-18 RX ORDER — METOCLOPRAMIDE HYDROCHLORIDE 5 MG/ML
10 INJECTION INTRAMUSCULAR; INTRAVENOUS ONCE
Status: COMPLETED | OUTPATIENT
Start: 2024-11-18 | End: 2024-11-18

## 2024-11-18 RX ORDER — LIDOCAINE HYDROCHLORIDE 10 MG/ML
INJECTION, SOLUTION EPIDURAL; INFILTRATION; INTRACAUDAL; PERINEURAL AS NEEDED
Status: DISCONTINUED | OUTPATIENT
Start: 2024-11-18 | End: 2024-11-18 | Stop reason: SURG

## 2024-11-18 RX ORDER — HYDROMORPHONE HYDROCHLORIDE 1 MG/ML
0.4 INJECTION, SOLUTION INTRAMUSCULAR; INTRAVENOUS; SUBCUTANEOUS EVERY 5 MIN PRN
Status: DISCONTINUED | OUTPATIENT
Start: 2024-11-18 | End: 2024-11-18

## 2024-11-18 RX ADMIN — SODIUM CHLORIDE, SODIUM LACTATE, POTASSIUM CHLORIDE, CALCIUM CHLORIDE: 600; 310; 30; 20 INJECTION, SOLUTION INTRAVENOUS at 09:33:00

## 2024-11-18 RX ADMIN — ONDANSETRON 4 MG: 2 INJECTION INTRAMUSCULAR; INTRAVENOUS at 09:29:00

## 2024-11-18 RX ADMIN — ROCURONIUM BROMIDE 50 MG: 10 INJECTION, SOLUTION INTRAVENOUS at 09:21:00

## 2024-11-18 RX ADMIN — LIDOCAINE HYDROCHLORIDE 50 MG: 10 INJECTION, SOLUTION EPIDURAL; INFILTRATION; INTRACAUDAL; PERINEURAL at 09:18:00

## 2024-11-18 RX ADMIN — SCOLOPAMINE TRANSDERMAL SYSTEM 1 PATCH: 1 PATCH, EXTENDED RELEASE TRANSDERMAL at 10:44:00

## 2024-11-18 RX ADMIN — DEXAMETHASONE SODIUM PHOSPHATE 8 MG: 4 MG/ML VIAL (ML) INJECTION at 09:28:00

## 2024-11-18 NOTE — ANESTHESIA POSTPROCEDURE EVALUATION
Patient: Chelsey Nelson    Procedure Summary       Date: 11/18/24 Room / Location: [facility] MAIN OR 02 / [facility] MAIN OR    Anesthesia Start: 0911 Anesthesia Stop: 1052    Procedure: Right gabriela-throidectomy (Right: Neck) Diagnosis:       Thyroid nodule      (Thyroid nodule [E04.1])    Surgeons: Ifeanyi Ram MD Anesthesiologist: Jossue Frank MD    Anesthesia Type: general ASA Status: 3            Anesthesia Type: general    Vitals Value Taken Time   /98 11/18/24 1050   Temp 97.2 11/18/24 1055   Pulse 95 11/18/24 1054   Resp 22 11/18/24 1054   SpO2 97 % room air 11/18/24 1054   Vitals shown include unfiled device data.    [facility] AN Post Evaluation:   Patient Evaluated in PACU  Patient Participation: complete - patient participated  Level of Consciousness: awake and alert  Pain Score: 2  Pain Management: adequate  Airway Patency:  Dental exam unchanged from preop  Yes    Nausea/Vomiting: none  Cardiovascular Status: acceptable, blood pressure returned to baseline and hemodynamically stable  Respiratory Status: acceptable  Postoperative Hydration acceptable  Comments: Wide awake, sitting up, talking, voice and vision intact.  C/o neck pain, more fentanyl given.      Jossue Frank MD  11/18/2024 10:55 AM

## 2024-11-18 NOTE — OPERATIVE REPORT
DATE OF SURGERY:  11/18/24  PREOPERATIVE DIAGNOSIS:  Right thyroid nodule  POSTOPERATIVE DIAGNOSIS: Same.  OPERATIVE PROCEDURE: Right hemithyroidectomy- modifier 22   SURGEON: Ifeanyi Ram M.D.  (Otolaryngology)  Assistant: Jossue Burr M.D. (Otolaryngology)  ANESTHESIA:  GENERAL.  Findings: Large right thyroid nodule about 5.5 cm in size. Fibrotic and inflamed capsule. RLN identified and preserved  Modifier 22- Obese and short neck with BMI 41 and a large nodule 5.5cm on CT scan with fibrotic and inflamed thyroid capsule. These factors led to additional and more difficult exposure, dissection and bleeding requiring additional time and effort.   PROCEDURE: Patient was taken to the operating room and placed supine on the operating table. They underwent an uneventful intubation.  A shoulder roll was placed and they were positioned and draped in usual fashion.  A timeout was performed and all parties were in agreement.  An approximately 8 cm neck incision was marked out and a pre-existing neck crease.  5 cc of 1% lidocaine with 1 and 100,000 epinephrine was then injected into the area of the incision.  I then incised through the skin with a 15 blade through the platysma revealing the anterior cervical fascia.  We then raised superior and inferior subplatysmal flaps to the level of the sternal notch inferiorly and the laryngeal prominence superiorly.  We then found the midline raphae and open this up superiorly and inferiorly along its length.  We divided the strap musculature until we encountered the thyroid capsule.  We began on the right side to dissect the strap musculature off of the thyroid capsule.  I then began to dissect the thyroid free from investing fascia and vasculature starting at the superior pole. The capsule was fairly thick and fibrotic. There was additional bleeding due to inflammation of the investing tissue. Once the superior pole was freed, we then began to work more inferiorly to free the  inferior pole. Following this the gland was more easily able to be delivered into the neck. Following this we rolled the gland more medially and carefully dissected the fascia and vasculature off the lateral aspect of the lobe.  We identified the area of the superior and inferior parathyroid glands in this area that we preserved and dropped off the specimen.  We continued to dissect fascia high off the gland to rule the gland medially.  The recurrent laryngeal nerve then came into view and was seen to be traveling in its usual course and not violated.  We then took care to dissect away from the nerve as we rolled the gland further medially onto the trachea.  We divided Barnett's ligament and the isthmus and delivered the right lobe out of the neck and sent for permament pathology.   We then assured hemostasis in the gutter wound bed. Valsalva  was performed which did not reveal any further bleeding.  A 10 flat OTTO drain was inserted. We then closed the strap musculature with a running 3-0 chromic suture.  Then closed the dep dermal layer with 3-0 chromic suture. I then closed the skin with a running prolene suture.   Patient was handed over to anesthesia who was extubated successfully. The patient was breathing stable and transported to PACU in stable condition.   Estimated blood loss:  50cc  Implants or drains: 10 flat OTTO drain  Specimen: right thyroid  Urine output: Per anesthesia

## 2024-11-18 NOTE — H&P
[facility]  part of [facility]     History & Physical    Chelsey Nelson Patient Status:  Hospital Outpatient Surgery    1985 MRN [id number]   Location [facility] PRE OP RECOVERY Attending Ifeanyi Ram MD   Hosp Day # 0 PCP STEPHAN Lyons     History of Present Illness:    Chelsey Nelson is a(n) 39 year old female who presents today for surgery per below.     History:  Past Medical History:    Anxiety state    Depression    Esophageal reflux    History of blood transfusion    Non Hodgkin's lymphoma (HCC)    PONV (postoperative nausea and vomiting)    Raynaud disease     Past Surgical History:   Procedure Laterality Date    Biopsy      at time of lymphoma diagnosis    Correct bunion,othr methods Bilateral      Family History   Problem Relation Age of Onset    Cancer Self 11        non hodgkins lymphoma    Prostate Cancer Father 63      reports that she has never smoked. She has never been exposed to tobacco smoke. She has never used smokeless tobacco. She reports that she does not currently use alcohol. She reports that she does not use drugs.    Allergies:  Allergies[1]    Home Medications:  Prescriptions Prior to Admission[2]    Physical Exam:   General: Alert, orientated x3.  Cooperative.  No apparent distress.    Pertinent exam findings may also be noted above in assessment and plan     Vital Signs:  /78 (BP Location: Right arm)   Pulse 97   Temp 98 °F (36.7 °C) (Oral)   Resp 16   Ht 5' 2\" (1.575 m)   Wt 225 lb (102.1 kg)   LMP 2023 (Exact Date)   SpO2 99%   BMI 41.15 kg/m²   Skin Inspection - Normal.   Constitutional Overall appearance - Normal.   Head/Face Symmetric, TMJ tenderness not present    Eyes EOMI, PERRL   Right ear:  Canal clear, TM intact, no ARASH   Left ear:  Canal clear, TM intact, no ARASH   Nose: Septum midline, inferior turbinates not enlarged, nasal valves without collapse    Oral cavity/Oropharynx: No lesions or masses on  inspection or palpation, tonsils symmetric    Neck: Soft without LAD, thyroid not enlarged  Voice clear/ no stridor       Laboratory Data:      Impression and Plan:  There is no problem list on file for this patient.  1. Thyroid nodule        Recommendations:  -Given the large size of this nodule over than 3 cm discussed the higher chance of a false negative on the FNA.  Will proceed with hemithyroidectomy given the 5.3 cm size.  No need for intervention on the left lobe.  -Will obtain a CT scan of her neck to characterize the nodule and assure no substernal extension  -Will obtain medical clearance.  Higher risk of anesthesia and difficulty with surgery due to her obesity  -Discussed the surgery in detail including risks and benefits and alternatives     I educated the patient on the pathophysiology of their thyroid disorder. Thyroidectomy was discussed including risks, benefits and alternatives. Risks include injury to the nerves that control the vocal cords causing temporary or permanent hoarseness or respiratory distress, hypocalcemia from hypoparathyroidism, bleeding, infection, risks of general anesthesia, and need for additional procedures. The patient understands, all questions were answered and would like to proceed.     11/18/24: Patient presents for OR. No changes since clinic. Clearances and CT obtained. Consent obtained.         Ifeanyi Ram MD  11/18/2024  8:39 AM         [1]   Allergies  Allergen Reactions    Shellfish HIVES, ITCHING, RASH, SHORTNESS OF BREATH, SWELLING, Tightness in Chest and Tightness in Throat   [2]   No medications prior to admission.

## 2024-11-18 NOTE — ANESTHESIA PROCEDURE NOTES
Airway  Date/Time: 11/18/2024 9:23 AM  Urgency: elective    Airway not difficult    General Information and Staff    Patient location during procedure: OR  Anesthesiologist: Jossue Frank MD  Performed: anesthesiologist   Performed by: Jossue Frank MD  Authorized by: Jossue Frank MD      Indications and Patient Condition  Indications for airway management: anesthesia  Spontaneous Ventilation: absent  Sedation level: deep  Preoxygenated: yes  Patient position: reverse trendelenburg  MILS maintained throughout  Mask difficulty assessment: 2 - vent by mask + OA or adjuvant +/- NMBA    Final Airway Details  Final airway type: endotracheal airway      Successful airway: ETT  Cuffed: yes   Successful intubation technique: Video laryngoscopy  Facilitating devices/methods: intubating stylet  Endotracheal tube insertion site: oral  Blade: Johnnie  Blade size: #3  ETT size (mm): 7.0    Cormack-Lehane Classification: grade I - full view of glottis  Placement verified by: capnometry   Cuff volume (mL): 5  Measured from: lips  ETT to lips (cm): 20  Number of attempts at approach: 1  Number of other approaches attempted: 0    Additional Comments  PreO2 to 100%.  Smooth induction, unable to mask as expected, oral airway, EZ mask, EMILEE.  Carvajal #3 full view, cuff MOP, BBS, taped left, OG in & Out x 1 to decompress stomach, esoph temp.

## 2024-11-18 NOTE — ANESTHESIA PREPROCEDURE EVALUATION
Anesthesia PreOp Note    HPI:     Chelsey Nelson is a 39 year old female who presents for preoperative consultation requested by: Ifeanyi Ram MD    Date of Surgery: 11/18/2024    Procedure(s):  Right gabriela-throidectomy  Indication: Thyroid nodule [E04.1]    Relevant Problems   No relevant active problems       NPO:  Last Liquid Consumption Date: 11/17/24  Last Liquid Consumption Time: 2300  Last Solid Consumption Date: 11/17/24  Last Solid Consumption Time: 2300  Last Liquid Consumption Date: 11/17/24          History Review:  There are no active problems to display for this patient.      Past Medical History:    Anxiety state    Depression    Esophageal reflux    History of blood transfusion    Non Hodgkin's lymphoma (HCC)    PONV (postoperative nausea and vomiting)    Raynaud disease       Past Surgical History:   Procedure Laterality Date    Biopsy      at time of lymphoma diagnosis    Correct bunion,othr methods Bilateral 2016       Prescriptions Prior to Admission[1]  Current Medications and Prescriptions Ordered in Epic[2]    Allergies[3]    Family History   Problem Relation Age of Onset    Cancer Self 11        non hodgkins lymphoma    Prostate Cancer Father 63     Social History     Socioeconomic History    Marital status: Single   Tobacco Use    Smoking status: Never     Passive exposure: Never    Smokeless tobacco: Never   Substance and Sexual Activity    Alcohol use: Not Currently    Drug use: Never       Available pre-op labs reviewed.  Lab Results   Component Value Date    URINEPREG Negative 11/18/2024             Vital Signs:  Body mass index is 41.15 kg/m².   height is 1.575 m (5' 2\") and weight is 102.1 kg (225 lb). Her oral temperature is 98 °F (36.7 °C). Her blood pressure is 123/78 and her pulse is 97. Her respiration is 16 and oxygen saturation is 99%.   Vitals:    11/13/24 1103 11/18/24 0745   BP:  123/78   Pulse:  97   Resp:  16   Temp:  98 °F (36.7 °C)   TempSrc:  Oral   SpO2:  99%    Weight: 102.5 kg (226 lb) 102.1 kg (225 lb)   Height: 1.575 m (5' 2\")         Anesthesia Evaluation     Patient summary reviewed and Nursing notes reviewed    History of anesthetic complications   Airway   Mallampati: III  TM distance: <3 FB  Neck ROM: full  Dental      Pulmonary - normal exam    breath sounds clear to auscultation  Cardiovascular   Exercise tolerance: poor    Rhythm: regular  Rate: normal    Neuro/Psych    (+)  anxiety/panic attacks,  depression      GI/Hepatic/Renal    (+) GERD poorly controlled    Endo/Other    Abdominal                  Anesthesia Plan:   ASA:  3  Plan:   General  Airway:  ETT  Post-op Pain Management: IV analgesics  Plan Comments: Preg Neg.  PONV.  Informed Consent Plan and Risks Discussed With:  Patient  Use of Blood Products Discussed With:  Patient  Blood Product Use Consented        I have informed Chelsey Nelson and/or legal guardian or family member of the nature of the anesthetic plan, benefits, risks including possible dental damage if relevant, major complications, and any alternative forms of anesthetic management.   All of the patient's questions were answered to the best of my ability. The patient desires the anesthetic management as planned.  Jossue Frank MD  11/18/2024 8:52 AM  Present on Admission:  **None**           [1]   No medications prior to admission.   [2]   Current Facility-Administered Medications Ordered in Epic   Medication Dose Route Frequency Provider Last Rate Last Admin    lactated ringers infusion   Intravenous Continuous Ifeanyi Ram MD 20 mL/hr at 11/18/24 0753 New Bag at 11/18/24 0753     No current [facility]-ordered outpatient medications on file.   [3]   Allergies  Allergen Reactions    Shellfish HIVES, ITCHING, RASH, SHORTNESS OF BREATH, SWELLING, Tightness in Chest and Tightness in Throat

## 2024-11-19 ENCOUNTER — TELEPHONE (OUTPATIENT)
Dept: OTOLARYNGOLOGY | Facility: CLINIC | Age: 39
End: 2024-11-19

## 2024-11-19 NOTE — TELEPHONE ENCOUNTER
Post op day #1 right gabriela-thyroidectomy.    Per patient is doing well, has minimal pain, some discomfort from drain site, has steristrips clean and dry,  Is drinking fluids well, eating softer foods, has a mild sore throat, good pain relief from norco, elevating head, using ice to neck area for comfort, no drainage, no bleeding, no shower until drain is removed, no exercise, heavy lifting or strenuous activity for 2 weeks,per MD can drive in 48 hours as long as not taking narcotics ie [place],  to call office if any uncontrolled pain, bleeding, unusual drainage, fever over 101 F, or any concerning symptoms.  Patient emptied joann drain at 2050 11/18/24 of 24 ml of bloody drainage and 11/19/24 at 0900 of 15 ml. Clearer bloody drainage. Dr. Ram aware and patient informed that RN will call in morning for drain output from tonight and tomorrow morning, if less than 30 ml. Then may remove drain in office, once MD okays removal tomorrow.  Verbalized understanding.  Future Appointments   Date Time Provider Department Center   11/25/2024 12:40 PM Ifeanyi Ram MD [facility]   '

## 2024-11-20 ENCOUNTER — NURSE ONLY (OUTPATIENT)
Dept: OTOLARYNGOLOGY | Facility: CLINIC | Age: 39
End: 2024-11-20
Payer: COMMERCIAL

## 2024-11-20 VITALS — DIASTOLIC BLOOD PRESSURE: 82 MMHG | SYSTOLIC BLOOD PRESSURE: 119 MMHG | HEART RATE: 73 BPM

## 2024-11-20 DIAGNOSIS — Z48.03 CHANGE OR REMOVAL OF DRAINS: Primary | ICD-10-CM

## 2024-11-20 PROCEDURE — 3079F DIAST BP 80-89 MM HG: CPT | Performed by: STUDENT IN AN ORGANIZED HEALTH CARE EDUCATION/TRAINING PROGRAM

## 2024-11-20 PROCEDURE — 99024 POSTOP FOLLOW-UP VISIT: CPT | Performed by: STUDENT IN AN ORGANIZED HEALTH CARE EDUCATION/TRAINING PROGRAM

## 2024-11-20 PROCEDURE — 3074F SYST BP LT 130 MM HG: CPT | Performed by: STUDENT IN AN ORGANIZED HEALTH CARE EDUCATION/TRAINING PROGRAM

## 2024-11-20 NOTE — PROGRESS NOTES
Pt here for OTTO drain removal Right Constantin thyroidectomy   Pt identified w/2 identifiers and orders verified for OTTO drain removal.  Pt assisted to exam chair and positioned for possible OTTO drain removal.  Pt presents w/and OTTO bulb to suction.  Pt denies c/o pain, use of analgesics, and s/s infection.  Pt brought log w/OTTO outputs.  Outputs for last 3 days/24-hour periods in cc:23 ml    Dr. Ram notified of OTTO outputs and verbal order to remove drain  Suction to OTTO drain released.  Sterile normal saline used to clean sutures and around OTTO insertion site.  Sutures to OTTO drain removed without difficulty.  OTTO drain tubing steadily pulled out w/minimal resistance while counter pressure applied to skin near drain.  Pt tolerated procedure well; no complications.  Wound w/minimal serosanguinous drainage; dressing applied over the wound site and secured w/tape.  OTTO tubing w/cut end and intact.  Pt instructed to call the office if he/she needs a dressing adjustment.  Pt also instructed to call the office w/any questions and/or concerns.  Pt verbalized an understanding.  Pt reminded of appt on 11/25/24

## 2024-11-20 NOTE — TELEPHONE ENCOUNTER
pt drain output last night at 7:50 pm was 15 ml and this morning at 8:30 8 ml, light pink/clear in color. ok to have drain pulled today?

## 2024-11-25 ENCOUNTER — OFFICE VISIT (OUTPATIENT)
Dept: OTOLARYNGOLOGY | Facility: CLINIC | Age: 39
End: 2024-11-25
Payer: COMMERCIAL

## 2024-11-25 VITALS — HEIGHT: 62 IN | BODY MASS INDEX: 41.41 KG/M2 | WEIGHT: 225 LBS

## 2024-11-25 DIAGNOSIS — E04.1 THYROID NODULE: Primary | ICD-10-CM

## 2024-11-25 NOTE — PROGRESS NOTES
Chelsey Nelson is a 39 year old female.   Chief Complaint   Patient presents with    Post-Op     Post-op visit- suture removal     HPI:   39-year-old status post hemithyroidectomy last week.  She is doing very well    No current outpatient medications on file.      Past Medical History:    Anxiety state    Depression    Esophageal reflux    History of blood transfusion    Non Hodgkin's lymphoma (HCC)    PONV (postoperative nausea and vomiting)    Raynaud disease      Social History:  Social History     Socioeconomic History    Marital status: Single   Tobacco Use    Smoking status: Never     Passive exposure: Never    Smokeless tobacco: Never   Substance and Sexual Activity    Alcohol use: Not Currently    Drug use: Never      Past Surgical History:   Procedure Laterality Date    Biopsy      at time of lymphoma diagnosis    Correct bunion,othr methods Bilateral 2016    Other surgical history Right 11/18/2024    Right gabriela-throidectomy         EXAM:   Ht 5' 2\" (1.575 m)   Wt 225 lb (102.1 kg)   LMP 11/26/2023 (Exact Date)   BMI 41.15 kg/m²     System Details   Skin Inspection - Normal.   Constitutional Overall appearance - Normal.   Head/Face Symmetric, TMJ tenderness not present    Eyes EOMI, PERRL   Right ear:  Canal clear, TM intact, no ARASH   Left ear:  Canal clear, TM intact, no ARSAH   Nose: Septum midline, inferior turbinates not enlarged, nasal valves without collapse    Oral cavity/Oropharynx: No lesions or masses on inspection or palpation, tonsils symmetric    Neck: Soft without LAD   Voice clear/ no stridor  Incision is healing well.  Voice is strong   Other:      SCOPES AND PROCEDURES:         AUDIOGRAM AND IMAGING:         IMPRESSION:   1. Thyroid nodule       Recommendations:  -Doing well after hemithyroidectomy for large nodule.  Pathology benign.  Healing well without issues.  Discussed postprocedural care and return precautions    The patient indicates understanding of these issues and agrees to  the plan.      Ifeanyi Ram MD  11/25/2024  1:03 PM

## 2025-02-15 ENCOUNTER — HOSPITAL ENCOUNTER (EMERGENCY)
Facility: HOSPITAL | Age: 40
Discharge: HOME | End: 2025-02-15
Attending: EMERGENCY MEDICINE
Payer: COMMERCIAL

## 2025-02-15 VITALS
TEMPERATURE: 98 F | OXYGEN SATURATION: 100 % | BODY MASS INDEX: 42 KG/M2 | RESPIRATION RATE: 20 BRPM | SYSTOLIC BLOOD PRESSURE: 158 MMHG | HEART RATE: 108 BPM | WEIGHT: 230 LBS | DIASTOLIC BLOOD PRESSURE: 97 MMHG

## 2025-02-15 DIAGNOSIS — F45.8 BRUXISM: ICD-10-CM

## 2025-02-15 DIAGNOSIS — R68.84 JAW PAIN: Primary | ICD-10-CM

## 2025-02-15 PROCEDURE — 99283 EMERGENCY DEPT VISIT LOW MDM: CPT

## 2025-02-15 RX ORDER — CARBAMAZEPINE 200 MG/1
200 TABLET ORAL ONCE
Status: COMPLETED | OUTPATIENT
Start: 2025-02-15 | End: 2025-02-15

## 2025-02-15 RX ORDER — IBUPROFEN 600 MG/1
600 TABLET, FILM COATED ORAL ONCE
Status: COMPLETED | OUTPATIENT
Start: 2025-02-15 | End: 2025-02-15

## 2025-02-15 RX ORDER — CARBAMAZEPINE 100 MG/1
100 TABLET, CHEWABLE ORAL 2 TIMES DAILY
Qty: 14 TABLET | Refills: 0 | Status: SHIPPED | OUTPATIENT
Start: 2025-02-15 | End: 2025-02-22

## 2025-02-16 NOTE — ED INITIAL ASSESSMENT (HPI)
Pt arrives ambulatory to ED for c/o L jaw pain radiating into her brain described as shooting pain x 1week. Pt has seen the dentist multiple time without answers/relief. Aox4, speaking in full sentences.

## 2025-02-16 NOTE — ED PROVIDER NOTES
Patient Seen in: [facility] Emergency Department      History     Chief Complaint   Patient presents with    Jaw Pain     Stated Complaint: Jaw pain    Subjective:   HPI          Objective:     Past Medical History:    Anxiety state    Depression    Esophageal reflux    History of blood transfusion    Non Hodgkin's lymphoma (HCC)    PONV (postoperative nausea and vomiting)    Raynaud disease              Past Surgical History:   Procedure Laterality Date    Biopsy      at time of lymphoma diagnosis    Correct bunion,othr methods Bilateral 2016    Other surgical history Right 11/18/2024    Right gabriela-throidectomy                Social History     Socioeconomic History    Marital status: Single   Tobacco Use    Smoking status: Never     Passive exposure: Never    Smokeless tobacco: Never   Substance and Sexual Activity    Alcohol use: Not Currently    Drug use: Never                  Physical Exam     ED Triage Vitals [02/15/25 2019]   BP (!) 158/97   Pulse 108   Resp 20   Temp 98.2 °F (36.8 °C)   Temp src Temporal   SpO2 100 %   O2 Device None (Room air)       Current Vitals:   Vital Signs  BP: (!) 158/97  Pulse: 108  Resp: 20  Temp: 98.2 °F (36.8 °C)  Temp src: Temporal    Oxygen Therapy  SpO2: 100 %  O2 Device: None (Room air)        Physical Exam        ED Course   Labs Reviewed - No data to display                MDM      40-year-old female with history of non-Hodgkin's lymphoma as a child and possible crest syndrome presents today with left jaw pain.  Patient states has been having pain in her left teeth for over a week.  She was seen by her dentist who had a root canal last Thursday though this did not improve symptoms.  She took ibuprofen and antibiotics including her recently prescribed Augmentin without improvement.  She has follow-up in 3 days with dentistry but was referred due to concern for possible trigeminal neuralgia.    On exam, vitals reassuring, well-appearing, full range of motion of the  neck, significant bruxism of all of her teeth on top and bottom.  Dental pulp exposure.  No clear evidence of gingival abscess.  No tenderness on the side of the face.    Differential: Bruxism, tooth pain, trigeminal neuralgia    We will trial a prescription for carbamazepine pending her dentistry follow-up.  Patient also given careful return precautions.        MDM    Disposition and Plan     Clinical Impression:  1. Jaw pain    2. Bruxism         Disposition:  Discharge  2/15/2025  9:08 pm    Follow-up:  Your dentis    Follow up in 3 day(s)            Medications Prescribed:  Discharge Medication List as of 2/15/2025  9:29 PM        START taking these medications    Details   carBAMazepine 100 MG Oral Chew Tab Chew 1 tablet (100 mg total) by mouth 2 (two) times daily for 7 days., Normal, Disp-14 tablet, R-0                 Supplementary Documentation:

## (undated) DEVICE — EVACUATOR SUR 100CC SIL BLB WND

## (undated) DEVICE — NEEDLE BLNT 18GA L1.5IN FILL DISP PRECISGLDE

## (undated) DEVICE — SUCTION CANISTER, 3000CC,SAFELINER: Brand: DEROYAL

## (undated) DEVICE — PACK CUSTTOM NECK ACCESSORY

## (undated) DEVICE — SYRINGE MED 10ML LL TIP W/O SFTY DISP

## (undated) DEVICE — SOLUTION IRRIG 1000ML 0.9% NACL USP BTL

## (undated) DEVICE — SUT PERMA- 2-0 30IN SH NABSRB BLK L26MM 1/

## (undated) DEVICE — GAMMEX® PI HYBRID SIZE 7.5, STERILE POWDER-FREE SURGICAL GLOVE, POLYISOPRENE AND NEOPRENE BLEND: Brand: GAMMEX

## (undated) DEVICE — APPLICATOR PREP 26ML CHG 2% ISO ALC 70%

## (undated) DEVICE — GOWN,SIRUS,FAB REINF,RAGLAN,XL,STERILE: Brand: MEDLINE

## (undated) DEVICE — STANDARD HYPODERMIC NEEDLE,POLYPROPYLENE HUB: Brand: MONOJECT

## (undated) DEVICE — PACK CDS HEAD

## (undated) DEVICE — SUT CHRM GUT 3-0 27IN SH ABSRB UD 26MM 1/2

## (undated) DEVICE — DRAIN SUR W10MMXL20CM SIL FULL PERF HUBLESS

## (undated) DEVICE — GAUZE,SPONGE,4"X4",16PLY,XRAY,STRL,LF: Brand: MEDLINE

## (undated) DEVICE — SUT PERMA- 2-0 18IN NABSRB BLK TIE SILK

## (undated) NOTE — LETTER
AUTHORIZATION FOR SURGICAL OPERATION OR OTHER PROCEDURE    1. I hereby authorize Dr. David Chamberlain, and [facility] staff assigned to my case to perform the following operation and/or procedure at the [facility] site:    Colposcopy with possible Biopsy    2.  My physician has explained the nature and purpose of the operation or other procedure, possible alternative methods of treatment, the risks involved, and the possibility of complication to me.  I acknowledge that no guarantee has been made as to the result that may be obtained.  3.  I recognize that, during the course of this operation, or other procedure, unforseen conditions may necessitate additional or different procedure than those listed above.  I, therefore, further authorize and request that the above named physician, his/her physician assistants or designees perform such procedures as are, in his/her professional opinion, necessary and desirable.  4.  Any tissue or organs removed in the operation or other procedure may be disposed of by and at the discretion of the [facility] and [facility].  5.  I understand that in the event of a medical emergency, I will be transported by local paramedics to [facility] or other hospital emergency department.  6.  I certify that I have read and fully understand the above consent to operation and/or other procedure.    7.  I acknowledge that my physician has explained sedation/analgesia administration to me including the risks and benefits.  I consent to the administration of sedation/analgesia as may be necessary or desirable in the judgement of my physician.    Witness signature: ___________________________________________________ Date:  07/12/2024                    Time:  ________ A.M.  P.M.       Patient Name:  ______________________________________________________  (please print)      Patient signature:   ___________________________________________________             Relationship to Patient:           []  Parent    Responsible person                          []  Spouse  In case of minor or                    [] Other  _____________   Incompetent name:  __________________________________________________                               (please print)      _____________      Responsible person  In case of minor or  Incompetent signature:  _______________________________________________    Statement of Physician  My signature below affirms that prior to the time of the procedure, I have explained to the patient and/or his/her guardian, the risks and benefits involved in the proposed treatment and any reasonable alternative to the proposed treatment.  I have also explained the risks and benefits involved in the refusal of the proposed treatment and have answered the patient's questions.                        Date: 07/12/2024  Provider                      Signature:  __________________________________________________________       Time:  ___________ A.M    P.M.

## (undated) NOTE — LETTER
[facility] ULTRASOUND  [address]  [place] [address]  Authorization for Imaging Procedure  Date of Procedure:     I hereby authorize  __________, my physician and his/her assistants (if applicable), which may include medical students, residents, and/or fellows, to perform the following procedure and administer such anesthesia as may be determined necessary by my physician: ULTRASOUND GUIDED RIGHT THYROID, 2 LESIONS  ASPIRATIONS BIOPSY on Chelsey Vidalesierrez.   2.  I recognize that during the procedure, unforeseen conditions may necessitate additional or different procedures than those listed above. I, therefore, further authorize and request that the above-named physician, assistants, or designees perform such procedures as are, in their judgment, necessary and desirable.    3.  My physician has discussed prior to my procedure the potential benefits, risks and side effects of this procedure; the likelihood of achieving goals; and potential problems that might occur during recuperation. They also discussed reasonable alternatives to the procedure, including risks, benefits, and side effects related to the alternatives and risks related to not receiving this procedure. I have had all my questions answered and I acknowledge that no guarantee has been made as to the result that may be obtained.    4.  Should the need arise during my procedure, which includes change of level of care prior to discharge, I also consent to the administration of blood and/or blood products. Further, I understand that despite careful testing and screening of blood or blood products by collecting agencies, I may still be subject to ill effects as a result of receiving a blood transfusion and/or blood products. The following are some, but not all, of the potential risks that can occur: fever and allergic reactions, hemolytic reactions, transmission of diseases such as Hepatitis, AIDS and Cytomegalovirus (CMV) and fluid  overload. In the event that I wish to have an autologous transfusion of my own blood, or a directed donor transfusion, I will discuss this with my physician.  Check only if Refusing Blood or Blood Products  I understand refusal of blood or blood products as deemed necessary by my physician may have serious consequences to my condition to include possible death. I hereby assume responsibility for my refusal and release the hospital, its personnel, and my physicians from any responsibility for the consequences of my refusal.   [  ] Patient Refuses Blood      5.  I authorize the use of any specimen, organs, tissues, body parts or foreign objects that may be removed from my body during the procedure for diagnosis, research or teaching purposes and their subsequent disposal by hospital authorities. I also authorize the release of specimen test results and/or written reports to my treating physician on the hospital medical staff or other referring or consulting physicians involved in my care, at the discretion of the Pathologist or my treating physician.    6.  I consent to the photographing or videotaping of the procedures to be performed, including appropriate portions of my body for medical, scientific, or educational purposes, provided my identity is not revealed by the pictures or by descriptive texts accompanying them. If the procedure has been photographed/videotaped, the physician will obtain the original picture, image, videotape or CD. The hospital will not be responsible for storage, release or maintenance of the picture, image, tape or CD.   7.  I consent to the presence of a  or observers in the operating room as deemed necessary by my physician or their designees.    8.  I recognize that in the event my procedure results in extended X-Ray/fluoroscopy time, I may develop a skin reaction.    9.  If I have a Do Not Attempt Resuscitation (DNAR) order in place, that status will be suspended  while in the operating room, procedural suite, and during the recovery period unless otherwise explicitly stated by me (or a person authorized to consent on my behalf). The performing physician or my attending physician will determine when the applicable recovery period ends for purposes of reinstating the DNAR order.  10.  I acknowledge that my physician has explained sedation/analgesia administration to me including the risk and benefits I consent to the administration of sedation/analgesia as may be necessary or desirable in the judgment of my physician.      I CERTIFY THAT I HAVE READ AND FULLY UNDERSTAND THE ABOVE CONSENT FOR THE PROCEDURE.     Signature of Patient: _____________________________________________________________    Responsible person in case of minor, unconscious: ____________________________________  Relationship to patient:  __________________________________________________________  Signature of Witness: _______________________________Date: _________Time: __________  Statement of Physician: My signature below affirms that prior to the time of the procedure, I have explained to the patient and/or her guardian, the risks and benefits involved in the proposed treatment and any reasonable alternative to the proposed treatment. I have also explained the risks and benefits involved in the refusal of the proposed treatment and have answered the patient's questions. If I have a significant financial interest in a co-management agreement or a significant financial interest in any product or implant, or other significant relationship used in the procedure/surgery, I have disclosed this and had a discussion with my patient.  Signature of Physician:   _________________________________Date:_____________Time:________  Patient Name: Chelseydarrell VidalesNelson : 1985  Printed: 2024  Medical Record #: [id number]